# Patient Record
Sex: FEMALE | Race: WHITE | NOT HISPANIC OR LATINO | Employment: FULL TIME | URBAN - METROPOLITAN AREA
[De-identification: names, ages, dates, MRNs, and addresses within clinical notes are randomized per-mention and may not be internally consistent; named-entity substitution may affect disease eponyms.]

---

## 2023-05-02 ENCOUNTER — TELEPHONE (OUTPATIENT)
Dept: PRIMARY CARE | Facility: CLINIC | Age: 43
End: 2023-05-02

## 2023-05-03 DIAGNOSIS — M54.50 LOW BACK PAIN, UNSPECIFIED BACK PAIN LATERALITY, UNSPECIFIED CHRONICITY, UNSPECIFIED WHETHER SCIATICA PRESENT: ICD-10-CM

## 2024-08-07 ENCOUNTER — TELEMEDICINE (OUTPATIENT)
Dept: PRIMARY CARE | Facility: CLINIC | Age: 44
End: 2024-08-07
Payer: COMMERCIAL

## 2024-08-07 DIAGNOSIS — U07.1 COVID-19: Primary | ICD-10-CM

## 2024-08-07 PROCEDURE — 99213 OFFICE O/P EST LOW 20 MIN: CPT | Performed by: FAMILY MEDICINE

## 2024-08-07 PROCEDURE — 1036F TOBACCO NON-USER: CPT | Performed by: FAMILY MEDICINE

## 2024-08-07 RX ORDER — ALBUTEROL SULFATE 90 UG/1
2 INHALANT RESPIRATORY (INHALATION) EVERY 4 HOURS PRN
Qty: 8.5 G | Refills: 0 | Status: SHIPPED | OUTPATIENT
Start: 2024-08-07 | End: 2025-08-07

## 2024-08-07 RX ORDER — LEVONORGESTREL 52 MG/1
1 INTRAUTERINE DEVICE INTRAUTERINE ONCE
COMMUNITY
Start: 2021-12-13

## 2024-08-07 NOTE — PROGRESS NOTES
Subjective   Patient ID: Lynnette Valero is a 44 y.o. female who presents for Covid-19 Home Monitoring Video Visit (Cough, sore throat, fever for a few weeks, just tested positive for covid today. ).  Very pleasant 44-year-old with sore throat cough congestion  Tested positive for COVID today  Has history of reactive airway and is concerned  Is able to walk across the room without feeling short of breath  Video visit done today to wake communication A/V due to her COVID-positive status and she consented to the visit        Review of Systems  Constitutional: no chills, no fever and no night sweats.   Eyes: no blurred vision and no eyesight problems.   ENT: no hearing loss, no nasal congestion, no nasal discharge, no hoarseness and no sore throat.   Cardiovascular: no chest pain, no intermittent leg claudication, no lower extremity edema, no palpitations and no syncope.   Respiratory: no cough, no shortness of breath during exertion, no shortness of breath at rest and no wheezing.   Gastrointestinal: no abdominal pain, no blood in stools, no constipation, no diarrhea, no melena, no nausea, no rectal pain and no vomiting.   Genitourinary: no dysuria, no change in urinary frequency, no urinary hesitancy, no feelings of urinary urgency and no vaginal discharge.   Musculoskeletal: no arthralgias,  no back pain and no myalgias.   Integumentary: no new skin lesions and no rashes.   Neurological: no difficulty walking, no headache, no limb weakness, no numbness and no tingling.   Psychiatric: no anxiety, no depression, no anhedonia and no substance use disorders.   Endocrine: no recent weight gain and no recent weight loss.   Hematologic/Lymphatic: no tendency for easy bruising and no swollen glands .    Objective    There were no vitals taken for this visit.   Physical Exam  Patient appears well and in no acute distress  No conversational dyspnea  She is able to breathe deeply without coughing  No apparent accessory muscle  use or retractions  Assessment/Plan   Problem List Items Addressed This Visit       COVID-19 - Primary    Relevant Medications    albuterol (ProAir HFA) 90 mcg/actuation inhaler                  Paxlovid                Close observation   Followup in office if no improvement          Analilia Becerra MD

## 2024-08-16 PROBLEM — U07.1 COVID-19: Status: ACTIVE | Noted: 2024-08-16

## 2024-08-18 NOTE — PROGRESS NOTES
"Subjective   Patient ID: Lynnette Valero is a 44 y.o. female who presents for Follow-up (Tested positive for covid 2 weeks ago.  Still coughing, phlegm, wheezing, and fatigue.   She took 3 days of the paxlovid, but complained of burning in her chest. ).    HPI   43 yo female presents for fu covid    Tested +on 8/7 Symptoms began a few days prior  Was seen for virtual visit by dr aldridge on 8/7 and rxd paxlovid- only took for 3 days due to bad taste  Had fevers, chills at onset  Co persistent congestion, drainage, thick mucous, right ear pressure/crackling, right sided sore throat, cough  Nonsmoker  No hx of asthma  No recent fevers  Co persistent fatigue  Last seen 9/2022  S/p gastric sleeve at [facility]; wt is down      Review of Systems  ROS as stated in HPI    Objective   /76   Pulse 72   Temp 37.1 °C (98.7 °F)   Ht 1.784 m (5' 10.25\")   Wt 110 kg (243 lb 3.2 oz)   BMI 34.65 kg/m²     Physical Exam  Constitutional: A&O; NAD  HEENT: conjunctiva normal. EOMI; PERRLA; lids normal; TM's clear fluid; there is an apthous ulcer right posterior palate  Neck: supple; No lymphadenopathy   Heart: RRR     Lungs: CTA bilateral   Neuro: No gross neuro deficits     Psych: Judgment, orientation, mood, affect, insight wnl   Assessment/Plan   Problem List Items Addressed This Visit             ICD-10-CM    COVID-19 - Primary U07.1     Other Visit Diagnoses         Codes    Bronchitis     J40    Relevant Medications    amoxicillin (Amoxil) 875 mg tablet    methylPREDNISolone (Medrol Dospak) 4 mg tablets        Supportive tx  Rx amox and medrol humble  Fu if persists or worsens  Fu for physical         "

## 2024-08-19 ENCOUNTER — APPOINTMENT (OUTPATIENT)
Dept: PRIMARY CARE | Facility: CLINIC | Age: 44
End: 2024-08-19
Payer: COMMERCIAL

## 2024-08-19 VITALS
SYSTOLIC BLOOD PRESSURE: 124 MMHG | DIASTOLIC BLOOD PRESSURE: 76 MMHG | BODY MASS INDEX: 34.82 KG/M2 | HEART RATE: 72 BPM | WEIGHT: 243.2 LBS | HEIGHT: 70 IN | TEMPERATURE: 98.7 F

## 2024-08-19 DIAGNOSIS — J40 BRONCHITIS: ICD-10-CM

## 2024-08-19 DIAGNOSIS — U07.1 COVID-19: Primary | ICD-10-CM

## 2024-08-19 PROCEDURE — 99213 OFFICE O/P EST LOW 20 MIN: CPT | Performed by: FAMILY MEDICINE

## 2024-08-19 PROCEDURE — 1036F TOBACCO NON-USER: CPT | Performed by: FAMILY MEDICINE

## 2024-08-19 PROCEDURE — 3008F BODY MASS INDEX DOCD: CPT | Performed by: FAMILY MEDICINE

## 2024-08-19 RX ORDER — AMOXICILLIN 875 MG/1
875 TABLET, FILM COATED ORAL 2 TIMES DAILY
Qty: 20 TABLET | Refills: 0 | Status: SHIPPED | OUTPATIENT
Start: 2024-08-19 | End: 2024-08-29

## 2024-08-19 RX ORDER — METHYLPREDNISOLONE 4 MG/1
TABLET ORAL
Qty: 21 TABLET | Refills: 0 | Status: SHIPPED | OUTPATIENT
Start: 2024-08-19 | End: 2024-08-26

## 2024-08-19 ASSESSMENT — PATIENT HEALTH QUESTIONNAIRE - PHQ9
2. FEELING DOWN, DEPRESSED OR HOPELESS: NOT AT ALL
1. LITTLE INTEREST OR PLEASURE IN DOING THINGS: NOT AT ALL
SUM OF ALL RESPONSES TO PHQ9 QUESTIONS 1 AND 2: 0

## 2024-08-19 ASSESSMENT — ENCOUNTER SYMPTOMS
TROUBLE SWALLOWING: 0
HEADACHES: 0
SHORTNESS OF BREATH: 1
HOARSE VOICE: 1
DIARRHEA: 0
NECK PAIN: 0
ABDOMINAL PAIN: 0
VOMITING: 0
SWOLLEN GLANDS: 0
STRIDOR: 0
COUGH: 1
SORE THROAT: 1

## 2024-12-03 ENCOUNTER — APPOINTMENT (OUTPATIENT)
Dept: PRIMARY CARE | Facility: CLINIC | Age: 44
End: 2024-12-03
Payer: COMMERCIAL

## 2025-05-04 ASSESSMENT — PROMIS GLOBAL HEALTH SCALE
CARRYOUT_PHYSICAL_ACTIVITIES: COMPLETELY
RATE_AVERAGE_PAIN: 5
RATE_AVERAGE_FATIGUE: MODERATE
EMOTIONAL_PROBLEMS: SOMETIMES
RATE_SOCIAL_SATISFACTION: VERY GOOD
RATE_GENERAL_HEALTH: GOOD
RATE_PHYSICAL_HEALTH: GOOD
CARRYOUT_SOCIAL_ACTIVITIES: EXCELLENT
RATE_QUALITY_OF_LIFE: VERY GOOD
RATE_MENTAL_HEALTH: VERY GOOD

## 2025-05-04 NOTE — PROGRESS NOTES
"Subjective   Patient ID: Lynnette Valero is a 45 y.o. female who presents for Annual Exam (She is not fasting for blood work today.  ).    HPI   44 yo female presents for a physical     sees gyn dr everett for exams/mammos   has mirena IUD  12/2024 mammo-neg    immunizations:   unsure of last tetanus- will get  today  flu shot-defers  covid shots- had      BMI 36  S/p gastric sleeve 10/2022 at [facility] ; last saw them for fu with labs 6/2023  taking vitamins  +walking  has been trying to eat healthy.     sees razia  sees her dentist for regular cleanings      She denies any chest pains, palpitations, edema, shortness of breath, abdominal pains, nausea, vomiting, diarrhea, constipation, blood in stool, melena.  occ reflux    plans to fu for skin check - has seen in past  co inc in facial hair and would like to check hormones     has never had a colonoscopy  no fam hx of colon CA  prefers cologuard    has 2 kids- in volleyball, baseball       Review of Systems  ROS as stated in HPI  Objective   /73   Pulse 62   Temp 36.9 °C (98.5 °F)   Ht 1.784 m (5' 10.25\")   Wt 117 kg (258 lb 6.4 oz)   BMI 36.81 kg/m²     Physical Exam  Constitutional: A&O; NAD  HEENT: conjunctiva normal. EOMI; PERRLA; lids normal; TM's clear;   Neck: supple; No lymphadenopathy   Heart: RRR     Lungs: CTA bilateral   Abd: Soft, Nontender, Nondistended  Ext:  No edema;    Neuro: No gross neuro deficits    Psych: Judgment, orientation, mood, affect, insight wnl   Assessment/Plan   Problem List Items Addressed This Visit    None  Visit Diagnoses         Codes      Healthcare maintenance    -  Primary Z00.00    Relevant Orders    CBC    Comprehensive Metabolic Panel    Hemoglobin A1C    Lipid Panel    TSH with reflex to Free T4 if abnormal    Vitamin D 25-Hydroxy,Total (for eval of Vitamin D levels)      Colon cancer screening     Z12.11    Relevant Orders    Cologuard® colon cancer screening      Female hirsutism     L68.0    Relevant Orders    " Estrogens, Total    Progesterone    Testosterone, total and free    Prolactin level      History of gastric bypass     Z98.84    Relevant Orders    Ferritin    Iron and TIBC    Vitamin B12      Encounter for immunization     Z23    Relevant Orders    Tdap vaccine, age 7 years and older (Completed)          sees gyn for exams/mammos  tdap given today  healthy lifestyle-diet, exercise                     .   check labs/hormones  Recommend colon cancer screening- pt prefers cologuard- ordered

## 2025-05-05 ENCOUNTER — APPOINTMENT (OUTPATIENT)
Dept: PRIMARY CARE | Facility: CLINIC | Age: 45
End: 2025-05-05
Payer: COMMERCIAL

## 2025-05-05 VITALS
DIASTOLIC BLOOD PRESSURE: 73 MMHG | WEIGHT: 258.4 LBS | TEMPERATURE: 98.5 F | HEART RATE: 62 BPM | SYSTOLIC BLOOD PRESSURE: 118 MMHG | HEIGHT: 70 IN | BODY MASS INDEX: 36.99 KG/M2

## 2025-05-05 DIAGNOSIS — Z00.00 HEALTHCARE MAINTENANCE: Primary | ICD-10-CM

## 2025-05-05 DIAGNOSIS — Z98.84 HISTORY OF GASTRIC BYPASS: ICD-10-CM

## 2025-05-05 DIAGNOSIS — Z23 ENCOUNTER FOR IMMUNIZATION: ICD-10-CM

## 2025-05-05 DIAGNOSIS — Z12.11 COLON CANCER SCREENING: ICD-10-CM

## 2025-05-05 DIAGNOSIS — L68.0 FEMALE HIRSUTISM: ICD-10-CM

## 2025-05-05 PROCEDURE — 1036F TOBACCO NON-USER: CPT | Performed by: FAMILY MEDICINE

## 2025-05-05 PROCEDURE — 90471 IMMUNIZATION ADMIN: CPT | Performed by: FAMILY MEDICINE

## 2025-05-05 PROCEDURE — 3008F BODY MASS INDEX DOCD: CPT | Performed by: FAMILY MEDICINE

## 2025-05-05 PROCEDURE — 99396 PREV VISIT EST AGE 40-64: CPT | Performed by: FAMILY MEDICINE

## 2025-05-05 PROCEDURE — 90715 TDAP VACCINE 7 YRS/> IM: CPT | Performed by: FAMILY MEDICINE

## 2025-05-10 LAB
25(OH)D3+25(OH)D2 SERPL-MCNC: 32 NG/ML (ref 30–100)
ALBUMIN SERPL-MCNC: 4.1 G/DL (ref 3.6–5.1)
ALP SERPL-CCNC: 51 U/L (ref 31–125)
ALT SERPL-CCNC: 18 U/L (ref 6–29)
ANION GAP SERPL CALCULATED.4IONS-SCNC: 6 MMOL/L (CALC) (ref 7–17)
AST SERPL-CCNC: 18 U/L (ref 10–35)
BILIRUB SERPL-MCNC: 0.5 MG/DL (ref 0.2–1.2)
BUN SERPL-MCNC: 11 MG/DL (ref 7–25)
CALCIUM SERPL-MCNC: 9.1 MG/DL (ref 8.6–10.2)
CHLORIDE SERPL-SCNC: 103 MMOL/L (ref 98–110)
CHOLEST SERPL-MCNC: 188 MG/DL
CHOLEST/HDLC SERPL: 2.7 (CALC)
CO2 SERPL-SCNC: 28 MMOL/L (ref 20–32)
CREAT SERPL-MCNC: 0.66 MG/DL (ref 0.5–0.99)
EGFRCR SERPLBLD CKD-EPI 2021: 110 ML/MIN/1.73M2
ERYTHROCYTE [DISTWIDTH] IN BLOOD BY AUTOMATED COUNT: 12 % (ref 11–15)
EST. AVERAGE GLUCOSE BLD GHB EST-MCNC: 105 MG/DL
EST. AVERAGE GLUCOSE BLD GHB EST-SCNC: 5.8 MMOL/L
ESTROGEN SERPL-MCNC: 164 PG/ML
FERRITIN SERPL-MCNC: 97 NG/ML (ref 16–232)
GLUCOSE SERPL-MCNC: 95 MG/DL (ref 65–99)
HBA1C MFR BLD: 5.3 %
HCT VFR BLD AUTO: 42.5 % (ref 35–45)
HDLC SERPL-MCNC: 70 MG/DL
HGB BLD-MCNC: 14.6 G/DL (ref 11.7–15.5)
IRON SATN MFR SERPL: 40 % (CALC) (ref 16–45)
IRON SERPL-MCNC: 111 MCG/DL (ref 40–190)
LDLC SERPL CALC-MCNC: 95 MG/DL (CALC)
MCH RBC QN AUTO: 33.1 PG (ref 27–33)
MCHC RBC AUTO-ENTMCNC: 34.4 G/DL (ref 32–36)
MCV RBC AUTO: 96.4 FL (ref 80–100)
NONHDLC SERPL-MCNC: 118 MG/DL (CALC)
PLATELET # BLD AUTO: 315 THOUSAND/UL (ref 140–400)
PMV BLD REES-ECKER: 9.9 FL (ref 7.5–12.5)
POTASSIUM SERPL-SCNC: 4.6 MMOL/L (ref 3.5–5.3)
PROGEST SERPL-MCNC: <0.5 NG/ML
PROLACTIN SERPL-MCNC: 7.1 NG/ML
PROT SERPL-MCNC: 6.6 G/DL (ref 6.1–8.1)
RBC # BLD AUTO: 4.41 MILLION/UL (ref 3.8–5.1)
SODIUM SERPL-SCNC: 137 MMOL/L (ref 135–146)
TESTOST FREE SERPL-MCNC: 3.2 PG/ML (ref 0.1–6.4)
TESTOST SERPL-MCNC: 19 NG/DL (ref 2–45)
TIBC SERPL-MCNC: 279 MCG/DL (CALC) (ref 250–450)
TRIGL SERPL-MCNC: 131 MG/DL
TSH SERPL-ACNC: 1.81 MIU/L
VIT B12 SERPL-MCNC: 471 PG/ML (ref 200–1100)
WBC # BLD AUTO: 8 THOUSAND/UL (ref 3.8–10.8)

## 2025-05-12 ENCOUNTER — TELEPHONE (OUTPATIENT)
Dept: PRIMARY CARE | Facility: CLINIC | Age: 45
End: 2025-05-12
Payer: COMMERCIAL

## 2025-05-12 NOTE — TELEPHONE ENCOUNTER
----- Message from Ally Wilburn sent at 5/12/2025  4:22 PM EDT -----  Let pt know her hormone tests were all ok.     ----- Message -----  From: [facility] Results In  Sent: 5/7/2025   6:37 AM EDT  To: Ally Wilburn, DO

## 2025-05-13 LAB — NONINV COLON CA DNA+OCC BLD SCRN STL QL: POSITIVE

## 2025-05-14 ENCOUNTER — TELEPHONE (OUTPATIENT)
Dept: PRIMARY CARE | Facility: CLINIC | Age: 45
End: 2025-05-14
Payer: COMMERCIAL

## 2025-05-14 NOTE — TELEPHONE ENCOUNTER
----- Message from Ally Wilburn sent at 5/14/2025 10:31 AM EDT -----  Please let pt know her cologuard test was positive so I recommend she follow up with a GI specialist for a colonoscopy.  Enter referral for GI dx: positive cologuard  ----- Message -----  From: [facility] Results In  Sent: 5/7/2025   6:37 AM EDT  To: Ally Wilburn, DO

## 2026-05-07 ENCOUNTER — APPOINTMENT (OUTPATIENT)
Dept: PRIMARY CARE | Facility: CLINIC | Age: 46
End: 2026-05-07
Payer: COMMERCIAL